# Patient Record
Sex: FEMALE | Race: WHITE | Employment: FULL TIME | URBAN - METROPOLITAN AREA
[De-identification: names, ages, dates, MRNs, and addresses within clinical notes are randomized per-mention and may not be internally consistent; named-entity substitution may affect disease eponyms.]

---

## 2017-06-13 PROCEDURE — 87086 URINE CULTURE/COLONY COUNT: CPT | Performed by: INTERNAL MEDICINE

## 2017-06-13 PROCEDURE — 81001 URINALYSIS AUTO W/SCOPE: CPT | Performed by: INTERNAL MEDICINE

## 2017-06-26 PROCEDURE — 88175 CYTOPATH C/V AUTO FLUID REDO: CPT | Performed by: INTERNAL MEDICINE

## 2018-03-02 PROCEDURE — 86160 COMPLEMENT ANTIGEN: CPT | Performed by: INTERNAL MEDICINE

## 2018-03-27 PROBLEM — M75.01 ADHESIVE CAPSULITIS OF RIGHT SHOULDER: Status: ACTIVE | Noted: 2018-03-27

## 2018-05-19 PROCEDURE — 81003 URINALYSIS AUTO W/O SCOPE: CPT | Performed by: INTERNAL MEDICINE

## 2018-06-25 PROCEDURE — 81001 URINALYSIS AUTO W/SCOPE: CPT | Performed by: INTERNAL MEDICINE

## 2018-06-25 PROCEDURE — 83970 ASSAY OF PARATHORMONE: CPT | Performed by: INTERNAL MEDICINE

## 2018-11-13 PROBLEM — Z47.89 SURGICAL AFTERCARE, MUSCULOSKELETAL SYSTEM: Status: ACTIVE | Noted: 2018-11-13

## 2018-11-18 PROBLEM — M21.612 BUNION OF GREAT TOE OF LEFT FOOT: Status: ACTIVE | Noted: 2018-11-18

## 2019-01-22 PROBLEM — M21.611 BUNION OF GREAT TOE OF RIGHT FOOT: Status: ACTIVE | Noted: 2019-01-22

## 2019-05-25 PROCEDURE — 81003 URINALYSIS AUTO W/O SCOPE: CPT | Performed by: INTERNAL MEDICINE

## 2019-05-25 PROCEDURE — 86160 COMPLEMENT ANTIGEN: CPT | Performed by: INTERNAL MEDICINE

## 2019-05-25 PROCEDURE — 86225 DNA ANTIBODY NATIVE: CPT | Performed by: INTERNAL MEDICINE

## 2019-05-31 NOTE — PAT NURSING NOTE
Spoke with Tasneem in Dr. Darian Pool office and she confirmed that the [facility] hospitalist will provide the 24 hour H&P on the day of surgery.

## 2019-06-03 ENCOUNTER — HOSPITAL ENCOUNTER (OUTPATIENT)
Facility: HOSPITAL | Age: 64
Setting detail: HOSPITAL OUTPATIENT SURGERY
Discharge: HOME | End: 2019-06-03
Attending: PODIATRIST | Admitting: PODIATRIST
Payer: COMMERCIAL

## 2019-06-03 ENCOUNTER — APPOINTMENT (OUTPATIENT)
Dept: GENERAL RADIOLOGY | Facility: HOSPITAL | Age: 64
End: 2019-06-03
Attending: PODIATRIST
Payer: COMMERCIAL

## 2019-06-03 VITALS
WEIGHT: 190.25 LBS | SYSTOLIC BLOOD PRESSURE: 111 MMHG | HEIGHT: 67 IN | TEMPERATURE: 99 F | DIASTOLIC BLOOD PRESSURE: 51 MMHG | RESPIRATION RATE: 12 BRPM | OXYGEN SATURATION: 96 % | HEART RATE: 86 BPM | BODY MASS INDEX: 29.86 KG/M2

## 2019-06-03 DIAGNOSIS — M21.612 BUNION OF GREAT TOE OF LEFT FOOT: Primary | ICD-10-CM

## 2019-06-03 PROCEDURE — 73620 X-RAY EXAM OF FOOT: CPT | Performed by: PODIATRIST

## 2019-06-03 PROCEDURE — 0SGL04Z FUSION OF LEFT TARSOMETATARSAL JOINT WITH INTERNAL FIXATION DEVICE, OPEN APPROACH: ICD-10-PCS | Performed by: PODIATRIST

## 2019-06-03 PROCEDURE — 76942 ECHO GUIDE FOR BIOPSY: CPT | Performed by: PODIATRIST

## 2019-06-03 PROCEDURE — 0QSP04Z REPOSITION LEFT METATARSAL WITH INTERNAL FIXATION DEVICE, OPEN APPROACH: ICD-10-PCS | Performed by: PODIATRIST

## 2019-06-03 PROCEDURE — 0SRQ0JZ REPLACEMENT OF LEFT TOE PHALANGEAL JOINT WITH SYNTHETIC SUBSTITUTE, OPEN APPROACH: ICD-10-PCS | Performed by: PODIATRIST

## 2019-06-03 RX ORDER — HYDROCODONE BITARTRATE AND ACETAMINOPHEN 5; 325 MG/1; MG/1
2 TABLET ORAL AS NEEDED
Status: DISCONTINUED | OUTPATIENT
Start: 2019-06-03 | End: 2019-06-03

## 2019-06-03 RX ORDER — DEXAMETHASONE SODIUM PHOSPHATE 4 MG/ML
VIAL (ML) INJECTION AS NEEDED
Status: DISCONTINUED | OUTPATIENT
Start: 2019-06-03 | End: 2019-06-03

## 2019-06-03 RX ORDER — MIDAZOLAM HYDROCHLORIDE 1 MG/ML
1 INJECTION INTRAMUSCULAR; INTRAVENOUS EVERY 5 MIN PRN
Status: DISCONTINUED | OUTPATIENT
Start: 2019-06-03 | End: 2019-06-03

## 2019-06-03 RX ORDER — SODIUM CHLORIDE, SODIUM LACTATE, POTASSIUM CHLORIDE, CALCIUM CHLORIDE 600; 310; 30; 20 MG/100ML; MG/100ML; MG/100ML; MG/100ML
INJECTION, SOLUTION INTRAVENOUS CONTINUOUS
Status: DISCONTINUED | OUTPATIENT
Start: 2019-06-03 | End: 2019-06-03

## 2019-06-03 RX ORDER — BUPIVACAINE HYDROCHLORIDE 5 MG/ML
INJECTION, SOLUTION EPIDURAL; INTRACAUDAL AS NEEDED
Status: DISCONTINUED | OUTPATIENT
Start: 2019-06-03 | End: 2019-06-03

## 2019-06-03 RX ORDER — DIPHENHYDRAMINE HYDROCHLORIDE 50 MG/ML
12.5 INJECTION INTRAMUSCULAR; INTRAVENOUS AS NEEDED
Status: DISCONTINUED | OUTPATIENT
Start: 2019-06-03 | End: 2019-06-03

## 2019-06-03 RX ORDER — CLINDAMYCIN PHOSPHATE 900 MG/50ML
900 INJECTION INTRAVENOUS ONCE
Status: DISCONTINUED | OUTPATIENT
Start: 2019-06-03 | End: 2019-06-03 | Stop reason: HOSPADM

## 2019-06-03 RX ORDER — ACETAMINOPHEN 500 MG
1000 TABLET ORAL ONCE
Status: DISCONTINUED | OUTPATIENT
Start: 2019-06-03 | End: 2019-06-03 | Stop reason: HOSPADM

## 2019-06-03 RX ORDER — HYDROCODONE BITARTRATE AND ACETAMINOPHEN 5; 325 MG/1; MG/1
1 TABLET ORAL AS NEEDED
Status: DISCONTINUED | OUTPATIENT
Start: 2019-06-03 | End: 2019-06-03

## 2019-06-03 RX ORDER — POVIDONE-IODINE 10 MG/G
OINTMENT TOPICAL AS NEEDED
Status: DISCONTINUED | OUTPATIENT
Start: 2019-06-03 | End: 2019-06-03

## 2019-06-03 RX ORDER — ONDANSETRON 2 MG/ML
4 INJECTION INTRAMUSCULAR; INTRAVENOUS AS NEEDED
Status: DISCONTINUED | OUTPATIENT
Start: 2019-06-03 | End: 2019-06-03

## 2019-06-03 RX ORDER — TRIAMCINOLONE ACETONIDE 40 MG/ML
INJECTION, SUSPENSION INTRA-ARTICULAR; INTRAMUSCULAR AS NEEDED
Status: DISCONTINUED | OUTPATIENT
Start: 2019-06-03 | End: 2019-06-03

## 2019-06-03 RX ORDER — MEPERIDINE HYDROCHLORIDE 25 MG/ML
12.5 INJECTION INTRAMUSCULAR; INTRAVENOUS; SUBCUTANEOUS AS NEEDED
Status: DISCONTINUED | OUTPATIENT
Start: 2019-06-03 | End: 2019-06-03

## 2019-06-03 RX ORDER — HYDROMORPHONE HYDROCHLORIDE 1 MG/ML
0.4 INJECTION, SOLUTION INTRAMUSCULAR; INTRAVENOUS; SUBCUTANEOUS EVERY 5 MIN PRN
Status: DISCONTINUED | OUTPATIENT
Start: 2019-06-03 | End: 2019-06-03

## 2019-06-03 RX ORDER — HYDROCODONE BITARTRATE AND ACETAMINOPHEN 5; 325 MG/1; MG/1
1 TABLET ORAL EVERY 6 HOURS PRN
Qty: 30 TABLET | Refills: 0 | Status: SHIPPED | OUTPATIENT
Start: 2019-06-03 | End: 2019-10-25

## 2019-06-03 RX ORDER — ACETAMINOPHEN 500 MG
1000 TABLET ORAL ONCE
Status: ON HOLD | COMMUNITY
End: 2019-06-03

## 2019-06-03 RX ORDER — NALOXONE HYDROCHLORIDE 0.4 MG/ML
80 INJECTION, SOLUTION INTRAMUSCULAR; INTRAVENOUS; SUBCUTANEOUS AS NEEDED
Status: DISCONTINUED | OUTPATIENT
Start: 2019-06-03 | End: 2019-06-03

## 2019-06-03 NOTE — CONSULTS
[facility] Hospitalist History and Physical      CC:  Pre-op H+P     PCP: Lamar Cabrera MD      History of Present Illness: Patient is a 66year old female with PMH sig for SLE and HTN who presents prior to L bunionectomy with podiatry.   She states her SLE is we [facility]   • TOTAL KNEE REPLACEMENT Left         ALL:    Penicillins             HIVES  Sulfa Antibiotics       HIVES       No current facility-administered medications on file prior to encounter.    Current Outpatient Medications on File Pr Resp 17   Ht 5' 7\" (1.702 m)   Wt 190 lb 4.1 oz (86.3 kg)   SpO2 100%   BMI 29.80 kg/m²   General:  Alert, no distress, appears stated age. Head:  Normocephalic, without obvious abnormality, atraumatic.    Eyes:  Sclera anicteric, No conjunctival pallor,

## 2019-06-05 ENCOUNTER — HOSPITAL ENCOUNTER (OUTPATIENT)
Dept: GENERAL RADIOLOGY | Facility: HOSPITAL | Age: 64
Discharge: HOME | End: 2019-06-05
Attending: PODIATRIST
Payer: COMMERCIAL

## 2019-06-05 DIAGNOSIS — R52 PAIN: ICD-10-CM

## 2019-06-05 PROCEDURE — 76000 FLUOROSCOPY <1 HR PHYS/QHP: CPT | Performed by: PODIATRIST

## 2019-06-07 NOTE — OPERATIVE REPORT
[facility] OPERATIVE REPORT   PREOPERATIVE DIAGNOSIS:    Hallux valgus left foot, hypermobile 1st ray left foot, metatarsalgia, hammer toe left 2nd digit     POSTOPERATIVE DIAGNOSIS:  Same as pre op dx     PROCEDURE PERFORMED:    Andrea buncarlosct adequate anesthesia was achieved, a popliteal and saphenous block were achieved per Anesthesiology. A calf tourniquet was placed upon the patient's left lower extremity. The left foot was then scrubbed, prepped and draped in the usual aseptic manner.      O resect the distal aspect of the medial cuneiform joint, which was removed in total and passed off the surgical field. Next, the cut guide was introduced onto the surgical field and held with olive wires per protocol.  The sagittal saw was also used to [employer] elevator was the used to remove all plantar adhersions about the 2nd metatarsal. A Weil osteotomy was accomplished using a sagittal saw in order to fully visualize the plantar plate and associated injuries.   The capital fragment was translated pro hyperemic reaction to all digits of the left foot.

## 2019-06-15 PROBLEM — Z47.89 SURGICAL AFTERCARE, MUSCULOSKELETAL SYSTEM: Status: RESOLVED | Noted: 2018-11-13 | Resolved: 2019-06-15

## 2019-06-15 PROBLEM — M21.611 BUNION OF GREAT TOE OF RIGHT FOOT: Status: RESOLVED | Noted: 2019-01-22 | Resolved: 2019-06-15

## 2019-06-15 PROBLEM — Z47.89 AFTERCARE FOLLOWING SURGERY OF THE MUSCULOSKELETAL SYSTEM: Status: ACTIVE | Noted: 2019-06-15

## 2019-11-11 PROBLEM — M21.612 BUNION OF GREAT TOE OF LEFT FOOT: Status: RESOLVED | Noted: 2018-11-18 | Resolved: 2019-11-11

## 2019-11-11 PROBLEM — E78.5 HYPERLIPIDEMIA LDL GOAL <100: Status: ACTIVE | Noted: 2019-11-11

## 2019-11-11 PROBLEM — L57.8 SOLAR DERMATITIS: Status: ACTIVE | Noted: 2019-11-11

## 2019-11-11 PROBLEM — Z47.89 AFTERCARE FOLLOWING SURGERY OF THE MUSCULOSKELETAL SYSTEM: Status: RESOLVED | Noted: 2019-06-15 | Resolved: 2019-11-11

## 2019-11-11 PROBLEM — I70.0 AORTIC CALCIFICATION (HCC): Status: ACTIVE | Noted: 2019-11-11

## 2019-11-11 PROBLEM — M75.01 ADHESIVE CAPSULITIS OF RIGHT SHOULDER: Status: RESOLVED | Noted: 2018-03-27 | Resolved: 2019-11-11

## 2021-01-14 ENCOUNTER — IMMUNIZATION (OUTPATIENT)
Dept: LAB | Facility: HOSPITAL | Age: 66
End: 2021-01-14
Attending: EMERGENCY MEDICINE
Payer: COMMERCIAL

## 2021-01-14 DIAGNOSIS — Z23 NEED FOR VACCINATION: Primary | ICD-10-CM

## 2021-01-14 PROCEDURE — 0011A SARSCOV2 VAC 100MCG/0.5ML IM: CPT

## 2021-02-11 ENCOUNTER — IMMUNIZATION (OUTPATIENT)
Dept: LAB | Facility: HOSPITAL | Age: 66
End: 2021-02-11
Attending: PREVENTIVE MEDICINE
Payer: COMMERCIAL

## 2021-02-11 DIAGNOSIS — Z23 NEED FOR VACCINATION: Primary | ICD-10-CM

## 2021-02-11 PROCEDURE — 0012A SARSCOV2 VAC 100MCG/0.5ML IM: CPT

## 2024-07-30 ENCOUNTER — ANESTHESIA EVENT (OUTPATIENT)
Dept: SURGERY | Facility: HOSPITAL | Age: 69
End: 2024-07-30
Payer: MEDICARE

## 2024-07-30 ENCOUNTER — ANESTHESIA (OUTPATIENT)
Dept: SURGERY | Facility: HOSPITAL | Age: 69
End: 2024-07-30
Payer: MEDICARE

## 2024-07-30 ENCOUNTER — HOSPITAL ENCOUNTER (OUTPATIENT)
Facility: HOSPITAL | Age: 69
Setting detail: HOSPITAL OUTPATIENT SURGERY
Discharge: HOME | End: 2024-07-30
Attending: SURGERY | Admitting: SURGERY
Payer: MEDICARE

## 2024-07-30 VITALS
HEART RATE: 79 BPM | RESPIRATION RATE: 16 BRPM | TEMPERATURE: 97 F | WEIGHT: 189 LBS | BODY MASS INDEX: 31.49 KG/M2 | SYSTOLIC BLOOD PRESSURE: 132 MMHG | DIASTOLIC BLOOD PRESSURE: 63 MMHG | OXYGEN SATURATION: 96 % | HEIGHT: 65 IN

## 2024-07-30 LAB
PTH-INTACT SERPL-MCNC: 134.9 PG/ML (ref 18.5–88)
PTH-INTACT SERPL-MCNC: 63.2 PG/ML (ref 18.5–88)
PTH-INTACT SERPL-MCNC: 69.8 PG/ML (ref 18.5–88)

## 2024-07-30 PROCEDURE — 0GTN0ZZ RESECTION OF RIGHT INFERIOR PARATHYROID GLAND, OPEN APPROACH: ICD-10-PCS | Performed by: SURGERY

## 2024-07-30 PROCEDURE — 83970 ASSAY OF PARATHORMONE: CPT | Performed by: SURGERY

## 2024-07-30 PROCEDURE — 88305 TISSUE EXAM BY PATHOLOGIST: CPT | Performed by: SURGERY

## 2024-07-30 PROCEDURE — 88331 PATH CONSLTJ SURG 1 BLK 1SPC: CPT | Performed by: SURGERY

## 2024-07-30 RX ORDER — IBUPROFEN 800 MG/1
800 TABLET ORAL EVERY 8 HOURS PRN
Qty: 20 TABLET | Refills: 1 | Status: SHIPPED | OUTPATIENT
Start: 2024-07-30 | End: 2024-09-28

## 2024-07-30 RX ORDER — DEXAMETHASONE SODIUM PHOSPHATE 4 MG/ML
VIAL (ML) INJECTION AS NEEDED
Status: DISCONTINUED | OUTPATIENT
Start: 2024-07-30 | End: 2024-07-30 | Stop reason: SURG

## 2024-07-30 RX ORDER — METOCLOPRAMIDE HYDROCHLORIDE 5 MG/ML
10 INJECTION INTRAMUSCULAR; INTRAVENOUS EVERY 8 HOURS PRN
Status: DISCONTINUED | OUTPATIENT
Start: 2024-07-30 | End: 2024-07-30

## 2024-07-30 RX ORDER — EPHEDRINE SULFATE 50 MG/ML
INJECTION INTRAVENOUS AS NEEDED
Status: DISCONTINUED | OUTPATIENT
Start: 2024-07-30 | End: 2024-07-30 | Stop reason: SURG

## 2024-07-30 RX ORDER — SODIUM CHLORIDE, SODIUM LACTATE, POTASSIUM CHLORIDE, CALCIUM CHLORIDE 600; 310; 30; 20 MG/100ML; MG/100ML; MG/100ML; MG/100ML
INJECTION, SOLUTION INTRAVENOUS CONTINUOUS
Status: DISCONTINUED | OUTPATIENT
Start: 2024-07-30 | End: 2024-07-30

## 2024-07-30 RX ORDER — ACETAMINOPHEN 500 MG
1000 TABLET ORAL ONCE
Status: DISCONTINUED | OUTPATIENT
Start: 2024-07-30 | End: 2024-07-30 | Stop reason: HOSPADM

## 2024-07-30 RX ORDER — HYDROMORPHONE HYDROCHLORIDE 1 MG/ML
0.2 INJECTION, SOLUTION INTRAMUSCULAR; INTRAVENOUS; SUBCUTANEOUS EVERY 5 MIN PRN
Status: DISCONTINUED | OUTPATIENT
Start: 2024-07-30 | End: 2024-07-30

## 2024-07-30 RX ORDER — ACETAMINOPHEN 500 MG
1000 TABLET ORAL ONCE AS NEEDED
Status: DISCONTINUED | OUTPATIENT
Start: 2024-07-30 | End: 2024-07-30

## 2024-07-30 RX ORDER — HYDROCODONE BITARTRATE AND ACETAMINOPHEN 5; 325 MG/1; MG/1
2 TABLET ORAL ONCE AS NEEDED
Status: DISCONTINUED | OUTPATIENT
Start: 2024-07-30 | End: 2024-07-30

## 2024-07-30 RX ORDER — IRBESARTAN AND HYDROCHLOROTHIAZIDE 300; 12.5 MG/1; MG/1
1 TABLET, FILM COATED ORAL DAILY
COMMUNITY
Start: 2024-04-16

## 2024-07-30 RX ORDER — TOBRAMYCIN 3 MG/ML
2 SOLUTION/ DROPS OPHTHALMIC EVERY 6 HOURS
COMMUNITY
Start: 2024-07-27

## 2024-07-30 RX ORDER — NALOXONE HYDROCHLORIDE 0.4 MG/ML
0.08 INJECTION, SOLUTION INTRAMUSCULAR; INTRAVENOUS; SUBCUTANEOUS AS NEEDED
Status: DISCONTINUED | OUTPATIENT
Start: 2024-07-30 | End: 2024-07-30

## 2024-07-30 RX ORDER — HYDROCODONE BITARTRATE AND ACETAMINOPHEN 5; 325 MG/1; MG/1
1 TABLET ORAL ONCE AS NEEDED
Status: DISCONTINUED | OUTPATIENT
Start: 2024-07-30 | End: 2024-07-30

## 2024-07-30 RX ORDER — HYDROMORPHONE HYDROCHLORIDE 1 MG/ML
0.4 INJECTION, SOLUTION INTRAMUSCULAR; INTRAVENOUS; SUBCUTANEOUS EVERY 5 MIN PRN
Status: DISCONTINUED | OUTPATIENT
Start: 2024-07-30 | End: 2024-07-30

## 2024-07-30 RX ORDER — HYDROMORPHONE HYDROCHLORIDE 1 MG/ML
0.6 INJECTION, SOLUTION INTRAMUSCULAR; INTRAVENOUS; SUBCUTANEOUS EVERY 5 MIN PRN
Status: DISCONTINUED | OUTPATIENT
Start: 2024-07-30 | End: 2024-07-30

## 2024-07-30 RX ORDER — BUPIVACAINE HYDROCHLORIDE 5 MG/ML
INJECTION, SOLUTION EPIDURAL; INTRACAUDAL AS NEEDED
Status: DISCONTINUED | OUTPATIENT
Start: 2024-07-30 | End: 2024-07-30 | Stop reason: HOSPADM

## 2024-07-30 RX ORDER — ONDANSETRON 2 MG/ML
4 INJECTION INTRAMUSCULAR; INTRAVENOUS EVERY 6 HOURS PRN
Status: DISCONTINUED | OUTPATIENT
Start: 2024-07-30 | End: 2024-07-30

## 2024-07-30 RX ORDER — LIDOCAINE HYDROCHLORIDE 10 MG/ML
INJECTION, SOLUTION EPIDURAL; INFILTRATION; INTRACAUDAL; PERINEURAL AS NEEDED
Status: DISCONTINUED | OUTPATIENT
Start: 2024-07-30 | End: 2024-07-30 | Stop reason: SURG

## 2024-07-30 RX ORDER — METOCLOPRAMIDE HYDROCHLORIDE 5 MG/ML
INJECTION INTRAMUSCULAR; INTRAVENOUS AS NEEDED
Status: DISCONTINUED | OUTPATIENT
Start: 2024-07-30 | End: 2024-07-30 | Stop reason: SURG

## 2024-07-30 RX ORDER — ONDANSETRON 2 MG/ML
INJECTION INTRAMUSCULAR; INTRAVENOUS AS NEEDED
Status: DISCONTINUED | OUTPATIENT
Start: 2024-07-30 | End: 2024-07-30 | Stop reason: SURG

## 2024-07-30 RX ORDER — MIDAZOLAM HYDROCHLORIDE 1 MG/ML
INJECTION INTRAMUSCULAR; INTRAVENOUS AS NEEDED
Status: DISCONTINUED | OUTPATIENT
Start: 2024-07-30 | End: 2024-07-30 | Stop reason: SURG

## 2024-07-30 RX ADMIN — METOCLOPRAMIDE HYDROCHLORIDE 10 MG: 5 INJECTION INTRAMUSCULAR; INTRAVENOUS at 09:33:00

## 2024-07-30 RX ADMIN — ONDANSETRON 4 MG: 2 INJECTION INTRAMUSCULAR; INTRAVENOUS at 09:35:00

## 2024-07-30 RX ADMIN — DEXAMETHASONE SODIUM PHOSPHATE 8 MG: 4 MG/ML VIAL (ML) INJECTION at 09:29:00

## 2024-07-30 RX ADMIN — LIDOCAINE HYDROCHLORIDE 50 MG: 10 INJECTION, SOLUTION EPIDURAL; INFILTRATION; INTRACAUDAL; PERINEURAL at 09:09:00

## 2024-07-30 RX ADMIN — EPHEDRINE SULFATE 10 MG: 50 INJECTION INTRAVENOUS at 09:38:00

## 2024-07-30 RX ADMIN — MIDAZOLAM HYDROCHLORIDE 2 MG: 1 INJECTION INTRAMUSCULAR; INTRAVENOUS at 09:07:00

## 2024-07-30 RX ADMIN — SODIUM CHLORIDE, SODIUM LACTATE, POTASSIUM CHLORIDE, CALCIUM CHLORIDE: 600; 310; 30; 20 INJECTION, SOLUTION INTRAVENOUS at 09:05:00

## 2024-07-30 NOTE — DISCHARGE INSTRUCTIONS
May remove the gauze tomorrow morning  Leave the sterri strips intact  May shower  Diet and activity as tolerated  No lifting more than 5 pounds  No voice straining for 2 weeks  Ok to take ibuprofen 1 tablet every 8 hours as needed for pain  Ice pack around the incision site for the next 4 to 5 days  Elevate the head of the bed with 2 pillows for 5 days to reduce swelling

## 2024-07-30 NOTE — BRIEF OP NOTE
Pre-Operative Diagnosis: HYPERPARAITHYROIDISM     Post-Operative Diagnosis: HYPERPARAITHYROIDISM      Procedure Performed:   PARATHYROIDECTOMY WITH INTRAOPERATIVE ULTRASOUND, INTACT PARAHORMONE ASSAY, NERVE MONITORING AND IO FROZEN SECTION    Surgeons and Role:     * Frances Butcher MD - Primary    Assistant(s):  Surgical Assistant.: Malaika Solis CSA     Surgical Findings: RIGHT INFERIOR - HYPERCELLULAR; 49 TO 54% DROP IN INTACT PTH     Component      Latest Ref Rng 7/30/2024   PTH INTACT      18.5 - 88.0 pg/mL 63.2    PTH INTACT       69.8    PTH INTACT       134.9 (H)       Specimen: PARATHYROID GLAND     Estimated Blood Loss: Blood Output: 2 mL (7/30/2024 10:02 AM)    Dictation Number:  NA    Frances Butcher MD  7/30/2024  10:49 AM

## 2024-07-30 NOTE — ANESTHESIA POSTPROCEDURE EVALUATION
[facility] Patient Status:  Hospital Outpatient Surgery   Age/Gender 68 year old female MRN [id number]   Location [facility] POST ANESTHESIA CARE UNIT Attending Frances Butcher MD   Hosp Day # 0 PCP Bonita Smalls MD       Anesthesia Post-op Note    PARATHYROIDECTOMY WITH INTRAOPERATIVE ULTRASOUND, INTACT PARAHORMONE ASSAY, NERVE MONITORING    Procedure Summary       Date: 07/30/24 Room / Location:  MAIN OR 03 / [facility] MAIN OR    Anesthesia Start: 0905 Anesthesia Stop: 1051    Procedure: PARATHYROIDECTOMY WITH INTRAOPERATIVE ULTRASOUND, INTACT PARAHORMONE ASSAY, NERVE MONITORING (Neck) Diagnosis: (HYPERPARAITHYROIDISM)    Surgeons: Frances Butcher MD Anesthesiologist: Malaika Crook MD    Anesthesia Type: general ASA Status: 2            Anesthesia Type: general    Vitals Value Taken Time   /62 07/30/24 1119   Temp 97.5 °F (36.4 °C) 07/30/24 1049   Pulse 72 07/30/24 1133   Resp 14 07/30/24 1133   SpO2 95 % 07/30/24 1133   Vitals shown include unfiled device data.    Patient Location: PACU    Anesthesia Type: general    Airway Patency: patent    Postop Pain Control: adequate    Mental Status: mildly sedated but able to meaningfully participate in the post-anesthesia evaluation    Nausea/Vomiting: none    Cardiopulmonary/Hydration status: stable euvolemic    Complications: no apparent anesthesia related complications    Postop vital signs: stable    Comments: Hand off to receiving nurse completed with essential components of patient's care reviewed, RN expressed comfort with assuming care, and all questions answered.     Dental Exam: Unchanged from Preop    Patient to be discharged from PACU when criteria met.

## 2024-07-30 NOTE — ANESTHESIA PROCEDURE NOTES
Peripheral IV  Date/Time: 7/30/2024 9:14 AM  Inserted by: Malaika Crook MD    Placement  Needle size: 18 G  Laterality: right  Location: foot  Local anesthetic: none  Site prep: alcohol  Technique: anatomical landmarks  Attempts: 1

## 2024-07-30 NOTE — HISTORICAL OFFICE NOTE
The above referenced H&P in the office on 7/16/2024 by Dr. Godwin was reviewed by Frances Butcher MD on 7/30/2024, the patient was examined and no significant changes have occurred in the patient's condition since the H&P was performed.  Risks and benefits were discussed, proceed with procedure as planned.

## 2024-07-30 NOTE — ANESTHESIA PREPROCEDURE EVALUATION
PRE-OP EVALUATION    Patient Name: Dimple Vasquez    Admit Diagnosis: HYPERPARAITHYROIDISM    Pre-op Diagnosis: HYPERPARAITHYROIDISM    PARATHYROIDECTOMY WITH INTRAOPERATIVE ULTRASOUND, INTACT PARAHORMONE ASSAY, NERVE MONITORING    Anesthesia Procedure: PARATHYROIDECTOMY WITH INTRAOPERATIVE ULTRASOUND, INTACT PARAHORMONE ASSAY, NERVE MONITORING    Surgeons and Role:     * Frances Butcher MD - Primary    Pre-op vitals reviewed.  Temp: 98.6 °F (37 °C)  Pulse: 60  Resp: 16  BP: 125/50  SpO2: 97 %  Body mass index is 31.45 kg/m².    Current medications reviewed.  Hospital Medications:   [Transfer Hold] acetaminophen (Tylenol Extra Strength) tab 1,000 mg  1,000 mg Oral Once    lactated ringers infusion   Intravenous Continuous    bupivacaine PF (Marcaine) 0.5% injection    PRN       Outpatient Medications:     Medications Prior to Admission   Medication Sig Dispense Refill Last Dose    Irbesartan-hydroCHLOROthiazide 300-12.5 MG Oral Tab Take 1 tablet by mouth daily.   7/28/2024 at 0800    tobramycin 0.3 % Ophthalmic Solution Apply 2 drops to eye every 6 (six) hours.   7/30/2024 at 0500    triamcinolone 0.1 % External Cream Apply to affected area 1-2 times per day as needed for rash and itch on the back for 2 weeks on, 1 week off 30 g 0     metoprolol succinate 100 MG Oral Tablet 24 Hr Take 1 tablet (100 mg total) by mouth daily. 90 tablet 3 7/29/2024 at 2100    Candesartan Cilexetil-HCTZ 32-12.5 MG Oral Tab Take 1 tablet by mouth daily. 90 tablet 3     amLODIPine 2.5 MG Oral Tab Take 1 tablet (2.5 mg total) by mouth once daily. 90 tablet 3 7/29/2024 at 2100    Hydroxychloroquine Sulfate 200 MG Oral Tab Take 1 tablet (200 mg total) by mouth 2 (two) times daily. 180 tablet 3 7/29/2024 at 2100    Cholecalciferol (VITAMIN D) 2000 units Oral Tab Take 2 tablets by mouth daily. 180 tablet 3 7/29/2024 at 2100    MULTIVITAMIN OR QD   7/29/2024 at 2100       Allergies: Penicillins and Sulfa antibiotics      Anesthesia  Evaluation        Anesthetic Complications  (-) history of anesthetic complications         GI/Hepatic/Renal    Negative GI/hepatic/renal ROS.                             Cardiovascular        Exercise tolerance: good     MET: >4    (+) obesity  (+) hypertension and well controlled                                    Endo/Other    Negative endo/other ROS.                              Pulmonary                    (-) sleep apnea       Neuro/Psych    Negative neuro/psych ROS.                               Hypertension     Lupus (systemic lupus erythematosus) (HCC)     Left TKA     Solar dermatitis     Aortic calcification (HCC)     Hyperlipidemia LDL goal <100            Past Surgical History:   Procedure Laterality Date    Arthroscopy of joint unlisted            x3    Colonoscopy,diagnostic  12/3/08    Performed by BRYANNA ARROYO at [facility] [place], [facility]    Correct bunion,othr methods Right     Eye surgery      Bilateral Lasik surgery    Other surgical history      abdominal hernia    Other surgical history      left trigger finger release    Other surgical history  14    Left PFJ replacement by Dr. Araya    Total knee replacement Left      Social History     Socioeconomic History    Marital status:    Tobacco Use    Smoking status: Never    Smokeless tobacco: Never   Vaping Use    Vaping status: Never Used   Substance and Sexual Activity    Alcohol use: Yes     Alcohol/week: 3.0 standard drinks of alcohol     Types: 3 Standard drinks or equivalent per week     Comment: occasional    Drug use: No     History   Drug Use No     Available pre-op labs reviewed.               Airway      Mallampati: II  Mouth opening: 3 FB  TM distance: 4 - 6 cm  Neck ROM: full Cardiovascular    Cardiovascular exam normal.         Dental  Comment: No dental anomalies readily apparent from bedside exam except what is diagrammed below. However, cannot exclude evolving dental disease or vulnerability given  limitations of exam. Will reassess post induction and prior to any airway manipulation.                Pulmonary    Pulmonary exam normal.                 Other findings              ASA: 2   Plan: general  NPO status verified and patient meets guidelines.        Comment: I discussed the rare but serious complications of anesthesia, including but not limited to cardiovascular complications, allergic reaction to medications, pulomary/respiratory complications, post-operative nausea, post-op pain, damage to dentition, aspiration, or sore throat. The patient expressed understanding of plan and agreement.     Plan/risks discussed with: patient                Present on Admission:  **None**

## 2024-07-30 NOTE — ANESTHESIA PROCEDURE NOTES
Airway  Date/Time: 7/30/2024 9:24 AM  Urgency: elective      General Information and Staff    Patient location during procedure: OR  Anesthesiologist: Malaika Crook MD  Performed: anesthesiologist   Performed by: Malaika Crook MD  Authorized by: Malaika Crook MD      Indications and Patient Condition  Indications for airway management: anesthesia  Spontaneous Ventilation: absent  Sedation level: deep  Preoxygenated: yes  Patient position: sniffing  Mask difficulty assessment: 2 - vent by mask + OA or adjuvant +/- NMBA    Final Airway Details  Final airway type: endotracheal airway      Successful airway: ETT and NIM tube  Cuffed: yes   Successful intubation technique: Video laryngoscopy  Facilitating devices/methods: intubating stylet  Endotracheal tube insertion site: oral  Blade: GlideScope  Blade size: #3  ETT size (mm): 7.0    Cormack-Lehane Classification: grade I - full view of glottis  Placement verified by: capnometry   Cuff volume (mL): 8  Measured from: lips  ETT to lips (cm): 21  Number of attempts at approach: 1  Number of other approaches attempted: 0    Additional Comments  Dentition unchanged from preop

## 2024-07-31 NOTE — OPERATIVE REPORT
[facility]    PATIENT'S NAME: FAM BROUSSARD   ATTENDING PHYSICIAN: Frances Butcher M.D.   OPERATING PHYSICIAN: Frances Butcher M.D.   PATIENT ACCOUNT#:   [id number]    LOCATION:  PACU  PACU [address] [place] [address]  MEDICAL RECORD #:   [id number]       YOB: 1955  ADMISSION DATE:       07/30/2024      OPERATION DATE:  07/30/2024    OPERATIVE REPORT    PREOPERATIVE DIAGNOSIS:  Hyperparathyroidism.  POSTOPERATIVE DIAGNOSIS:  Hyperparathyroidism.  PROCEDURE:  Parathyroidectomy with intraoperative ultrasound, intact PTH assay, intraoperative frozen section and neuromonitoring.    ASSISTANT:  ARBEN Jimenez.     ANESTHESIA:  General.    ESTIMATED BLOOD LOSS:  2 mL.    SPECIMEN:  Parathyroid gland.    DISPOSITION:  To PACU.    COMPLICATIONS:  None.      INDICATIONS:  Patient is a 68-year-old female, who presented to the office with a diagnosis of hyperparathyroidism.  The patient has lupus, and on the recent DEXA scan showed osteoporosis.  She takes Fosamax for her osteoporosis.  However, she did not have any symptoms associated with hyperparathyroidism.  The highest PTH was 128.5, with vitamin D level of 47, and her calcium, the highest was 9.8.  So ultrasound was done in the office, able to visualize the potential left posterior parathyroid gland, but it was not with assurance, so decided to proceed with a 4D CT scan.  The 4D CT scan localized the gland potentially on the right side posterior to the thyroid gland, and then also a nuclear scan was obtained which did not localize any enlarged glands.  So based on this, discussed with the patient undergoing a parathyroidectomy and she may have more than 1 gland disease, but we will proceed with the ultrasound in the operating room before deciding surgery.  So, the risks and benefits of surgery were discussed, and patient agreed for the procedure, signed the informed consent.    FINDINGS:  Right inferior parathyroid gland hypercellular on frozen section.   PTH levels 134.9, 69.8, 63.2, and a 49% to 54% drop in the intact PTH level.    OPERATIVE TECHNIQUE:  Patient was brought to the operating room, was placed in supine position on the operating table.  Lower extremity SCD boots were placed.  After IV sedation and neuromonitoring, endotracheal tube placement, both arms were tucked at side and a roll was placed under shoulder blade, neck was slightly hyperextended.  Ultrasound was done and was not able to visualize any enlarged parathyroid gland on the left side.  However, the right side, where the CT scan indicated, the gland was identified.  So at this time, the area was prepped and draped into a sterile field.  Lower Kocher neck incision was made with a 15 blade.  Electric Bovie cautery was used to separate the subcutaneous tissue, and then the superior and inferior flaps raised.  Deep cervical fascia was identified, a midline incision was made, and then the right strap muscle was carefully dissected from the thyroid capsule.  As the thyroid gland was medially rotated, posterior to the thyroid gland the parathyroid gland was identified.  It was carefully dissected.  Blood vessel was ligated using a 3-0 silk.  The removed gland was sent down to Pathology for evaluation, and then 2 blood samples obtained.  The frozen section came back as hypercellular parathyroid gland; however, 10 minutes postexcision of the gland, it only dropped close to 50%, which we require over 50% drop in the intact PTH level.  At this time, an additional blood test was obtained to check, and at this point, it had dropped further to 54%, which met the criteria.  No further exploration was done, and a piece of Surgicel laid on the dissected tissue bed.  Then, 3-0 Vicryl was used to close the deep cervical and dermal layer.  Local anesthetic was injected into the surrounding tissue and a 4-0 Monocryl for the skin, placing a subcuticular stitch.  Incision was then covered with Steri-Strips, 4 x 4,  and tape.  The patient tolerated the procedure, well was extubated in the operating room and transferred to PACU in stable condition.  At the end of the case, the needle, instrument, and sponge counts were all correct.      The surgical assistant was medically necessary for the entire procedure to position the patient, prep the area, drape, retract the wound to remove the necessary gland, and assist in closing and transferring patient out of the operating room.    Dictated By Frances Butcher M.D.  d: 07/30/2024 10:55:40  t: 07/30/2024 16:05:28  [facility] [id number]  TL/

## (undated) DEVICE — SLEEVE COMPR MD KNEE LEN SGL USE KENDALL SCD

## (undated) DEVICE — 3M™ MICROPORE™ TAPE, 1530-2: Brand: 3M™ MICROPORE™

## (undated) DEVICE — SUTURE MONOCRYL 5-0 P-3

## (undated) DEVICE — REM POLYHESIVE ADULT PATIENT RETURN ELECTRODE: Brand: VALLEYLAB

## (undated) DEVICE — 3M™ STERI-STRIP™ REINFORCED ADHESIVE SKIN CLOSURES, R1547, 1/2 IN X 4 IN (12 MM X 100 MM), 6 STRIPS/ENVELOPE: Brand: 3M™ STERI-STRIP™

## (undated) DEVICE — CHLORAPREP 26ML APPLICATOR

## (undated) DEVICE — SOL  .9 1000ML BTL

## (undated) DEVICE — UNDYED BRAIDED (POLYGLACTIN 910), SYNTHETIC ABSORBABLE SUTURE: Brand: COATED VICRYL

## (undated) DEVICE — GAMMEX® PI HYBRID SIZE 6, STERILE POWDER-FREE SURGICAL GLOVE, POLYISOPRENE AND NEOPRENE BLEND: Brand: GAMMEX

## (undated) DEVICE — PIN GUARD 0.045 WHITE

## (undated) DEVICE — 1200CC GUARDIAN II: Brand: GUARDIAN

## (undated) DEVICE — EMG TUBE 8229707 NIM TRIVANTAGE 7.0MM ID: Brand: NIM TRIVANTAGE™

## (undated) DEVICE — ANTIBACTERIAL UNDYED BRAIDED (POLYGLACTIN 910), SYNTHETIC ABSORBABLE SUTURE: Brand: COATED VICRYL

## (undated) DEVICE — CONVERTORS STOCKINETTE: Brand: CONVERTORS

## (undated) DEVICE — PRECISION (7.0 X 0.51 X 29.5MM)

## (undated) DEVICE — SUT MCRYL 4-0 18IN PS-2 ABSRB UD 19MM 3/8 CIR

## (undated) DEVICE — THYROID: Brand: MEDLINE INDUSTRIES, INC.

## (undated) DEVICE — #15 STERILE STAINLESS BLADE: Brand: STERILE STAINLESS BLADES

## (undated) DEVICE — ABSORBABLE HEMOSTAT (OXIDIZED REGENERATED CELLULOSE): Brand: SURGICEL

## (undated) DEVICE — SUTURE VICRYL 5-0 PC-1

## (undated) DEVICE — ADHESIVE MASTISOL 2/3CC VL

## (undated) DEVICE — GLOVE,SURG,SENSICARE SLT,LF,PF,6.5: Brand: MEDLINE

## (undated) DEVICE — SOLUTION IRRIG 1000ML 0.9% NACL USP BTL

## (undated) DEVICE — 4.0MM OVAL CARBIDE BUR

## (undated) DEVICE — ZIMMER® STERILE DISPOSABLE TOURNIQUET CUFF WITH PLC, DUAL PORT, SINGLE BLADDER, 18 IN. (46 CM)

## (undated) DEVICE — DRESSING IN STRIPPABLE ENVELOPE: Brand: DERMACEA

## (undated) DEVICE — BLADE SAW KM33-11

## (undated) DEVICE — SUT PERMA- 3-0 30IN NABSRB BLK TIE SILK

## (undated) DEVICE — HOOK LOCK LATEX FREE ELASTIC BANDAGE 2INX5YD

## (undated) DEVICE — SPONGE RAYTEC 4X4 RF DETECT

## (undated) DEVICE — KENDALL SCD EXPRESS SLEEVES, KNEE LENGTH, MEDIUM: Brand: KENDALL SCD

## (undated) DEVICE — DRAPE MINI C-ARM

## (undated) DEVICE — LOWER EXTREMITY CDS-LF: Brand: MEDLINE INDUSTRIES, INC.

## (undated) DEVICE — SUTURE ETHILON 3-0 PS-2

## (undated) DEVICE — STRETCH BANDAGE: Brand: CURITY

## (undated) NOTE — LETTER
OUTSIDE TESTING RESULT REQUEST     IMPORTANT: FOR YOUR IMMEDIATE ATTENTION  Please FAX all test results listed below to: [phone]       * * * * If testing is NOT complete, arrange with patient A.S.A.P. * * * *      Patient Name: Dimple Vasquez  Surgery Date: 2024  Medical Record: [id number]  CSN: [id number]  : 1955 - A: 68 y     Sex: female  Surgeon(s):  Frances Butcher MD  Procedure: PARATHYROIDECTOMY WITH INTRAOPERATIVE ULTRASOUND, INTACT PARAHORMONE ASSAY, NERVE MONITORING  Anesthesia Type: General     Surgeon: Frances Butcher MD     The following Testing and Time Line are REQUIRED PER ANESTHESIA     EKG READ AND SIGNED WITHIN   90 days  BMP (requires 4 hour fast) within  90 days      Thank You,   Sent by:MANUEL REVELES

## (undated) NOTE — LETTER
[facility] Department  Phone: [phone]  OUTSIDE TESTING RESULT REQUEST      TO:   Dr Abdulaziz Levine Date: 5/17/19    FAX #: [phone]     IMPORTANT: FOR YOUR IMMEDIATE ATTENTION  Please FAX all test results listed below to: [phone]